# Patient Record
Sex: FEMALE | Race: WHITE | NOT HISPANIC OR LATINO | Employment: STUDENT | URBAN - METROPOLITAN AREA
[De-identification: names, ages, dates, MRNs, and addresses within clinical notes are randomized per-mention and may not be internally consistent; named-entity substitution may affect disease eponyms.]

---
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
## 2024-04-09 ENCOUNTER — HOSPITAL ENCOUNTER (EMERGENCY)
Facility: HOSPITAL | Age: 24
Discharge: HOME | End: 2024-04-10
Attending: EMERGENCY MEDICINE
Payer: COMMERCIAL

## 2024-04-09 DIAGNOSIS — R55 SYNCOPE: Primary | ICD-10-CM

## 2024-04-09 PROCEDURE — 93005 ELECTROCARDIOGRAM TRACING: CPT

## 2024-04-10 VITALS
RESPIRATION RATE: 20 BRPM | SYSTOLIC BLOOD PRESSURE: 115 MMHG | DIASTOLIC BLOOD PRESSURE: 77 MMHG | HEART RATE: 85 BPM | TEMPERATURE: 98.2 F | OXYGEN SATURATION: 98 %

## 2024-04-10 LAB
ATRIAL RATE: 109 BPM
P AXIS: 80 DEGREES
PR INTERVAL: 126 MS
QRS AXIS: 81 DEGREES
QRSD INTERVAL: 88 MS
QT INTERVAL: 330 MS
QTC INTERVAL: 444 MS
T WAVE AXIS: 37 DEGREES
VENTRICULAR RATE: 109 BPM

## 2024-04-10 PROCEDURE — 93010 ELECTROCARDIOGRAM REPORT: CPT | Performed by: INTERNAL MEDICINE

## 2024-04-10 NOTE — ED ATTENDING ATTESTATION
4/9/2024  I, Billy Willoughby MD, saw and evaluated the patient. I have discussed the patient with the resident/non-physician practitioner and agree with the resident's/non-physician practitioner's findings, Plan of Care, and MDM as documented in the resident's/non-physician practitioner's note, except where noted. All available labs and Radiology studies were reviewed.  I was present for key portions of any procedure(s) performed by the resident/non-physician practitioner and I was immediately available to provide assistance.       At this point I agree with the current assessment done in the Emergency Department.  I have conducted an independent evaluation of this patient a history and physical is as follows:    ED Course  ED Course as of 04/10/24 0044   Wed Apr 10, 2024   0028 Per resident h&p  23 YO F presents for syncope after emotional discussion with S.O.; was smoking marijuana earlier tonight; currently asymptomatic; fell down some steps; non N/V no HA O: CN intact; coordination NL ; RRR CTA I/P ECG NL; NL abd exam    Emergency Department Note- Treva Chaudhari 24 y.o. female MRN: [id number]    Unit/Bed#: ED 05 Encounter: [id number]    Treva Chaudhari is a 24 y.o. female who presents with   Chief Complaint   Patient presents with    Syncope     Pt had an unwitnessed episode of syncope. She states that before it occurred she felt dizzy. Pt states that she hit her head when she fell down. Denies taking any thinners.          History of Present Illness   HPI:  Treva Chaudhari is a 24 y.o. female who presents for evaluation of:  Syncopal episode.  Patient was having an emotional discussion with her significant other when she passed out and fell down some steps.  Patient has no history of seizure disorder, neurological disease, cardiac disease.  Patient has a history of prior vasovagal event.  Patient denies any headache currently neck pain, nausea, vomiting, abdominal pain, extremity pain.    Review of Systems    Constitutional:  Negative for fatigue and fever.   HENT:  Negative for congestion and sore throat.    Respiratory:  Negative for cough and shortness of breath.    Cardiovascular:  Negative for chest pain and palpitations.   Gastrointestinal:  Negative for abdominal pain and nausea.   Genitourinary:  Negative for flank pain and frequency.   Neurological:  Positive for syncope. Negative for light-headedness and headaches.   Psychiatric/Behavioral:  Negative for dysphoric mood and hallucinations.    All other systems reviewed and are negative.      Historical Information   History reviewed. No pertinent past medical history.  History reviewed. No pertinent surgical history.  Social History   Social History     Substance and Sexual Activity   Alcohol Use None     Social History     Substance and Sexual Activity   Drug Use Not on file     Social History     Tobacco Use   Smoking Status Not on file   Smokeless Tobacco Not on file     Family History: History reviewed. No pertinent family history.    Meds/Allergies   PTA meds:   None     No Known Allergies    Objective   First Vitals:   Blood Pressure: 150/96 (04/09/24 2243)  Pulse: (!) 117 (04/09/24 2243)  Temperature: 98.2 °F (36.8 °C) (04/09/24 2243)  Temp Source: Tympanic (04/09/24 2243)  Respirations: 22 (04/09/24 2243)  SpO2: 98 % (04/09/24 2243)    Current Vitals:   Blood Pressure: 115/77 (04/10/24 0015)  Pulse: 85 (04/10/24 0015)  Temperature: 98.2 °F (36.8 °C) (04/09/24 2243)  Temp Source: Tympanic (04/09/24 2243)  Respirations: 20 (04/10/24 0015)  SpO2: 98 % (04/10/24 0015)    No intake or output data in the 24 hours ending 04/10/24 0044    Invasive Devices       None                   Physical Exam  Vitals and nursing note reviewed.   Constitutional:       General: She is not in acute distress.     Appearance: Normal appearance. She is well-developed.   HENT:      Head: Normocephalic and atraumatic.      Right Ear: External ear normal.      Left Ear: External  "ear normal.      Nose: Nose normal.      Mouth/Throat:      Pharynx: No oropharyngeal exudate.   Eyes:      Conjunctiva/sclera: Conjunctivae normal.      Pupils: Pupils are equal, round, and reactive to light.   Cardiovascular:      Rate and Rhythm: Normal rate and regular rhythm.   Pulmonary:      Effort: Pulmonary effort is normal. No respiratory distress.   Abdominal:      General: Abdomen is flat. There is no distension.      Palpations: Abdomen is soft.   Musculoskeletal:         General: No deformity. Normal range of motion.      Cervical back: Normal range of motion and neck supple.   Skin:     General: Skin is warm and dry.      Capillary Refill: Capillary refill takes less than 2 seconds.   Neurological:      General: No focal deficit present.      Mental Status: She is alert and oriented to person, place, and time. Mental status is at baseline.      Coordination: Coordination normal.   Psychiatric:         Mood and Affect: Mood normal.         Behavior: Behavior normal.         Thought Content: Thought content normal.         Judgment: Judgment normal.           Medical Decision Makin.  Neurocardiogenic syncope: ECG rule out arrhythmia; follow-up with PCP as an outpatient.    Recent Results (from the past 36 hour(s))   ECG 12 lead    Collection Time: 24 10:46 PM   Result Value Ref Range    Ventricular Rate 109 BPM    Atrial Rate 109 BPM    NM Interval 126 ms    QRSD Interval 88 ms    QT Interval 330 ms    QTC Interval 444 ms    P Axis 80 degrees    QRS Axis 81 degrees    T Wave Axis 37 degrees     No orders to display         Portions of the record may have been created with voice recognition software. Occasional wrong word or \"sound a like\" substitutions may have occurred due to the inherent limitations of voice recognition software.  Read the chart carefully and recognize, using context, where substitutions have occurred.          Critical Care Time  Procedures      "

## 2024-04-10 NOTE — ED PROVIDER NOTES
History  Chief Complaint   Patient presents with    Syncope     Pt had an unwitnessed episode of syncope. She states that before it occurred she felt dizzy. Pt states that she hit her head when she fell down. Denies taking any thinners.      24-year-old female presents emergency department today for evaluation of syncopal episode that occurred about an hour before presentation.  She states that she broke up with her boyfriend yesterday and has been having stress reactions since that time.  She states she was on the back porch smoking weed talking to a friend on FaceTime when the topic came up.  She felt that she became suddenly stressed, anxious, began to sweat and noted that her vision began to fade out.  When she awoke/texting she remembers was laying on the ground down approximately 4 stairs onto the back with her friends around her.  Her friend accompanies her to the emergency department here and states that they heard loud sound outside and when they went onto the deck they found her down approximately 4 stairs on the ground.  Patient awoke spontaneously afterward felt little groggy but was otherwise feeling okay.  She has been able to walk since that time.  She states that she believes she hit her head because she has some very mild pain in the back of her head.  She denies nausea, vomiting, chest pain, shortness of breath, dizziness, lightheadedness, blurry vision or visual disturbances, photophobia, other musculoskeletal pains other than some mild pain to the back of the head.  She said that she had 1 syncopal episode in the past which is also related with smoking weed and stress reaction.  She denies any medication use.  States she is nonpregnant and on her menstrual cycle, has no cardiac or pulmonary medical problems.        None       History reviewed. No pertinent past medical history.    History reviewed. No pertinent surgical history.    History reviewed. No pertinent family history.  I have reviewed  and agree with the history as documented.    E-Cigarette/Vaping     E-Cigarette/Vaping Substances           Review of Systems   Constitutional:  Negative for activity change, chills, fatigue and fever.   Eyes:  Negative for visual disturbance.   Respiratory:  Negative for shortness of breath.    Cardiovascular:  Negative for chest pain and palpitations.   Gastrointestinal:  Negative for abdominal pain, nausea and vomiting.   Musculoskeletal:  Negative for neck pain and neck stiffness.   Skin:  Negative for rash and wound.   Neurological:  Positive for syncope. Negative for dizziness, weakness, numbness and headaches.   Psychiatric/Behavioral:  Negative for agitation, confusion and hallucinations. The patient is not hyperactive.        Physical Exam  ED Triage Vitals   Temperature Pulse Respirations Blood Pressure SpO2   04/09/24 2243 04/09/24 2243 04/09/24 2243 04/09/24 2243 04/09/24 2243   98.2 °F (36.8 °C) (!) 117 22 150/96 98 %      Temp Source Heart Rate Source Patient Position - Orthostatic VS BP Location FiO2 (%)   04/09/24 2243 04/10/24 0015 04/09/24 2243 04/09/24 2243 --   Tympanic Monitor Sitting Left arm       Pain Score       --                    Orthostatic Vital Signs  Vitals:    04/09/24 2243 04/10/24 0015   BP: 150/96 115/77   Pulse: (!) 117 85   Patient Position - Orthostatic VS: Sitting Sitting       Physical Exam  Constitutional:       General: She is not in acute distress.     Appearance: Normal appearance. She is not ill-appearing.   HENT:      Head: Normocephalic and atraumatic.      Comments: No visual signs of trauma of the head.  She has some very minimal tenderness palpation along the occiput with no overlying signs of injury.     Nose: Nose normal.      Mouth/Throat:      Mouth: Mucous membranes are moist.      Pharynx: Oropharynx is clear.   Eyes:      Extraocular Movements: Extraocular movements intact.      Pupils: Pupils are equal, round, and reactive to light.   Cardiovascular:       Rate and Rhythm: Normal rate and regular rhythm.      Pulses: Normal pulses.      Heart sounds: Normal heart sounds. No murmur heard.     No friction rub. No gallop.   Pulmonary:      Effort: Pulmonary effort is normal. No respiratory distress.      Breath sounds: Normal breath sounds. No wheezing.   Abdominal:      General: Abdomen is flat. There is no distension.      Palpations: Abdomen is soft.      Tenderness: There is no abdominal tenderness. There is no guarding.   Musculoskeletal:         General: No deformity.      Cervical back: Normal range of motion. No rigidity or tenderness.      Right lower leg: No edema.      Left lower leg: No edema.   Skin:     General: Skin is warm and dry.      Coloration: Skin is not pale.      Findings: No rash.   Neurological:      Mental Status: She is alert and oriented to person, place, and time.      Cranial Nerves: No cranial nerve deficit.      Sensory: No sensory deficit.      Motor: No weakness.      Coordination: Coordination normal.   Psychiatric:         Mood and Affect: Mood normal.         Behavior: Behavior normal.         ED Medications  Medications - No data to display    Diagnostic Studies  Results Reviewed       None                   No orders to display         Procedures  Procedures      ED Course                                       Medical Decision Making  24-year-old female presents emergency department today with history and examination most consistent with vasovagal/neuro cardiogenic syncope.  Physical examination is generally unremarkable.  EKG reveals patient normal sinus rhythm with no signs of arrhythmias, ACS, bundle-branch blocks, or heart block.  Based on normal examination normal EKG, patient is appropriate for discharge home.  Lab workup is not warranted.  Fall down 4 stairs with no symptoms other than some mild possible tenderness does not warrant imaging or CT scan.  Her neurologic examination is unremarkable.  We reviewed red flag  symptoms which require her to come back to the emergency department evaluation.  We also discussed follow-up with her primary care physician and to avoid using marijuana, has had a apparently has a significant effect on her.  The patient remained hemodynamically stable during her time in the emergency department and she is appropriate for discharge home.          Disposition  Final diagnoses:   Syncope     Time reflects when diagnosis was documented in both MDM as applicable and the Disposition within this note       Time User Action Codes Description Comment    4/10/2024 12:45 AM Anders Nino Add [R55] Syncope           ED Disposition       ED Disposition   Discharge    Condition   Stable    Date/Time   Wed Apr 10, 2024 12:45 AM    Comment   Treva Maribelnathan discharge to home/self care.                   Follow-up Information    None         Patient's Medications    No medications on file     No discharge procedures on file.    PDMP Review       None             ED Provider  Attending physically available and evaluated Treva Chaudhari. I managed the patient along with the ED Attending.    Electronically Signed by           Anders Nino MD  04/10/24 0054